# Patient Record
Sex: FEMALE | Race: WHITE | NOT HISPANIC OR LATINO | URBAN - METROPOLITAN AREA
[De-identification: names, ages, dates, MRNs, and addresses within clinical notes are randomized per-mention and may not be internally consistent; named-entity substitution may affect disease eponyms.]

---

## 2020-03-03 ENCOUNTER — EMERGENCY (EMERGENCY)
Facility: HOSPITAL | Age: 30
LOS: 1 days | Discharge: HOME | End: 2020-03-03
Admitting: EMERGENCY MEDICINE
Payer: COMMERCIAL

## 2020-03-03 VITALS
RESPIRATION RATE: 18 BRPM | OXYGEN SATURATION: 99 % | DIASTOLIC BLOOD PRESSURE: 96 MMHG | HEART RATE: 108 BPM | WEIGHT: 121.92 LBS | TEMPERATURE: 98 F | HEIGHT: 66 IN | SYSTOLIC BLOOD PRESSURE: 137 MMHG

## 2020-03-03 VITALS
SYSTOLIC BLOOD PRESSURE: 126 MMHG | OXYGEN SATURATION: 99 % | TEMPERATURE: 98 F | DIASTOLIC BLOOD PRESSURE: 76 MMHG | RESPIRATION RATE: 17 BRPM | HEART RATE: 76 BPM

## 2020-03-03 DIAGNOSIS — R00.2 PALPITATIONS: ICD-10-CM

## 2020-03-03 DIAGNOSIS — R11.2 NAUSEA WITH VOMITING, UNSPECIFIED: ICD-10-CM

## 2020-03-03 DIAGNOSIS — R25.1 TREMOR, UNSPECIFIED: ICD-10-CM

## 2020-03-03 PROCEDURE — 99284 EMERGENCY DEPT VISIT MOD MDM: CPT

## 2020-03-03 RX ORDER — ONDANSETRON 8 MG/1
1 TABLET, FILM COATED ORAL
Qty: 9 | Refills: 0
Start: 2020-03-03 | End: 2020-03-05

## 2020-03-03 RX ORDER — ONDANSETRON 8 MG/1
8 TABLET, FILM COATED ORAL ONCE
Refills: 0 | Status: COMPLETED | OUTPATIENT
Start: 2020-03-03 | End: 2020-03-03

## 2020-03-03 RX ADMIN — Medication 50 MILLIGRAM(S): at 10:26

## 2020-03-03 RX ADMIN — Medication 50 MILLIGRAM(S): at 13:04

## 2020-03-03 RX ADMIN — ONDANSETRON 8 MILLIGRAM(S): 8 TABLET, FILM COATED ORAL at 10:26

## 2020-03-03 NOTE — ED ADULT NURSE NOTE - CHIEF COMPLAINT QUOTE
Pt was binge drinking alcohol x 1 week. Last drink was last night. Presenting with palpitations and feeling anxious. No tremors. Vomited multiple times this am.

## 2020-03-03 NOTE — ED PROVIDER NOTE - OBJECTIVE STATEMENT
patient presents with palpitations, nausea, vomiting x 2 and anxiety. last drink was at 9a. states that she has been binge drinking over the past 2 weeks. 7-8 servings of wine or beer daily. states that over the past 2 years has had episodes of binging drinking. admits to withdrawl sx  of tremors and anxiety. denies hx of seizure, but states that she had blacked out a  month ago woke up incontinent and believes that she may have had a seizure

## 2020-03-03 NOTE — ED ADULT TRIAGE NOTE - CHIEF COMPLAINT QUOTE
Pt was binge drinking alcohol x 1 week. Presenting with palpitations and feeling anxious. No tremors. Vomited multiple times this am. Pt was binge drinking alcohol x 1 week. Last drink was last night. Presenting with palpitations and feeling anxious. No tremors. Vomited multiple times this am.

## 2020-03-03 NOTE — ED PROVIDER NOTE - CLINICAL SUMMARY MEDICAL DECISION MAKING FREE TEXT BOX
Patient presents with palpiations, anxiety, nausea after binge drinking for one week. last drink 4am today. no hx seizures, + withdrawl sx. initially tachycardic low 100's, tremulous, will give zofran 8mg, librium 8mg.

## 2020-03-03 NOTE — ED PROVIDER NOTE - PATIENT PORTAL LINK FT
You can access the FollowMyHealth Patient Portal offered by [facility] by registering at the following website: [email or web address]. By joining [facility]’s FollowMyHealth portal, you will also be able to view your health information using other applications (apps) compatible with our system.

## 2020-03-03 NOTE — ED ADULT NURSE NOTE - OBJECTIVE STATEMENT
28 y/o F c/o alcohol withdrawal. pt reports last drink was 930 am. reports she has been on a weeklong binge.

## 2020-03-03 NOTE — SBIRT NOTE ADULT - NSSBIRTALCPASSREFTXDET_GEN_A_CORE
Provided SBIRT services: Full screen positive. Referral to Treatment attempted. Screening results were reviewed with the patient and patient was provided information about healthy guidelines and potential negative consequences associated with level of risk. Motivation and readiness to reduce or stop use was discussed and goals and activities to make changes were suggested/offered.  Options discussed for further evaluation and treatment, but referral to treatment was not completed because Patient refused. Patient, however, received a list of substance abuse treatment felicities in the community.

## 2020-03-03 NOTE — ED PROVIDER NOTE - CARE PROVIDER_API CALL
Rolo Larson (DO)  [address], [facility] [place] [address]  Phone: [phone]  Fax: [phone]  Follow Up Time: